# Patient Record
Sex: MALE | Race: WHITE
[De-identification: names, ages, dates, MRNs, and addresses within clinical notes are randomized per-mention and may not be internally consistent; named-entity substitution may affect disease eponyms.]

---

## 2018-10-04 NOTE — CT
ABDOMEN CT WITH CONTRAST

PELVIC CT WITH CONTRAST 

10/4/18

 

HISTORY: 

Right upper quadrant pain .

 

COMPARISON:  

None.

 

TECHNIQUE:  

Abdomen and pelvic CT are performed with IV and oral contrast. Coronal reformatted images are submitt
ed for interpretation. 

 

FINDINGS:  

 

ABDOMEN CT:

Lung bases are clear. Normal heart size. No pericardial fluid. The descending thoracic aorta and abdo
vesna aorta have a normal caliber. No periaortic fat stranding. Gallbladder is unremarkable. Patent p
ortal vein is patent. 

 

Hypoattenuation of the liver due to hepatic steatosis. Spleen, pancreas, and adrenal glands are  unre
markable. 

 

No gastrohepatic, retrocrural or periportal lymphadenopathy. 

 

No mesenteric mass, lymphadenopathy, free air or free fluid. 

 

Hypodensities in the left and right kidney are too small to characterize. There may be a septated cys
t in the left kidney measuring 1.5 cm in craniocaudal dimension. Questionable enhancing septation in 
the upper pole of the right kidney measuring 1 cm in craniocaudal dimension. Additional hypodensities
 are noted. Bilaterally, no obstructive uropathy. 

 

Gastric mucosa, duodenum and multiple normal caliber small bowel loops. Ileocecal junction is normal.
 Normal caliber appendix. Scattered fecal material in a nondistended, nondilated colon. 

 

PELVIC CT:

No mass, lymphadenopathy, free air or free fluid. 

 

No lytic or blastic lesions in the osseous structures.

 

IMPRESSION:  

1.      Hypodense lesions in the left and right kidney as detailed above. Nonemergent abdomen MRI can
 be performed for better characterization. 

2.      Hepatic steatosis.

3.      No evidence of acute abnormality in the abdomen or pelvis. 

4.      Normal caliber appendix. 

 

POS: PPP
no

## 2018-11-08 ENCOUNTER — HOSPITAL ENCOUNTER (OUTPATIENT)
Dept: HOSPITAL 92 - TBSIIMAG | Age: 58
Discharge: HOME | End: 2018-11-08
Attending: ORTHOPAEDIC SURGERY
Payer: COMMERCIAL

## 2018-11-08 DIAGNOSIS — S43.401A: ICD-10-CM

## 2018-11-08 DIAGNOSIS — M75.121: Primary | ICD-10-CM

## 2018-11-08 DIAGNOSIS — M24.411: ICD-10-CM

## 2018-11-08 DIAGNOSIS — R60.0: ICD-10-CM

## 2018-11-08 DIAGNOSIS — M25.811: ICD-10-CM

## 2018-11-13 ENCOUNTER — HOSPITAL ENCOUNTER (OUTPATIENT)
Dept: HOSPITAL 92 - LABBT | Age: 58
Discharge: HOME | End: 2018-11-13
Attending: ORTHOPAEDIC SURGERY
Payer: COMMERCIAL

## 2018-11-13 DIAGNOSIS — M75.101: ICD-10-CM

## 2018-11-13 DIAGNOSIS — Z01.818: Primary | ICD-10-CM

## 2018-11-13 LAB
ANION GAP SERPL CALC-SCNC: 12 MMOL/L (ref 10–20)
BASOPHILS # BLD AUTO: 0.1 THOU/UL (ref 0–0.2)
BASOPHILS NFR BLD AUTO: 0.8 % (ref 0–1)
BUN SERPL-MCNC: 17 MG/DL (ref 8.4–25.7)
CALCIUM SERPL-MCNC: 9.9 MG/DL (ref 7.8–10.44)
CHLORIDE SERPL-SCNC: 103 MMOL/L (ref 98–107)
CO2 SERPL-SCNC: 26 MMOL/L (ref 22–29)
CREAT CL PREDICTED SERPL C-G-VRATE: 0 ML/MIN (ref 70–130)
EOSINOPHIL # BLD AUTO: 0.2 THOU/UL (ref 0–0.7)
EOSINOPHIL NFR BLD AUTO: 2.8 % (ref 0–10)
GLUCOSE SERPL-MCNC: 97 MG/DL (ref 70–105)
HGB BLD-MCNC: 15.3 G/DL (ref 14–18)
LYMPHOCYTES # BLD: 1.6 THOU/UL (ref 1.2–3.4)
LYMPHOCYTES NFR BLD AUTO: 21.4 % (ref 21–51)
MCH RBC QN AUTO: 32.7 PG (ref 27–31)
MCV RBC AUTO: 97.5 FL (ref 78–98)
MONOCYTES # BLD AUTO: 0.6 THOU/UL (ref 0.11–0.59)
MONOCYTES NFR BLD AUTO: 8.6 % (ref 0–10)
NEUTROPHILS # BLD AUTO: 4.9 THOU/UL (ref 1.4–6.5)
NEUTROPHILS NFR BLD AUTO: 66.3 % (ref 42–75)
PLATELET # BLD AUTO: 237 THOU/UL (ref 130–400)
POTASSIUM SERPL-SCNC: 3.7 MMOL/L (ref 3.5–5.1)
RBC # BLD AUTO: 4.69 MILL/UL (ref 4.7–6.1)
SODIUM SERPL-SCNC: 137 MMOL/L (ref 136–145)
WBC # BLD AUTO: 7.3 THOU/UL (ref 4.8–10.8)

## 2018-11-13 PROCEDURE — 93010 ELECTROCARDIOGRAM REPORT: CPT

## 2018-11-13 PROCEDURE — 93005 ELECTROCARDIOGRAM TRACING: CPT

## 2018-11-13 PROCEDURE — 80048 BASIC METABOLIC PNL TOTAL CA: CPT

## 2018-11-13 PROCEDURE — 85025 COMPLETE CBC W/AUTO DIFF WBC: CPT

## 2018-11-16 ENCOUNTER — HOSPITAL ENCOUNTER (OUTPATIENT)
Dept: HOSPITAL 92 - SDC | Age: 58
Discharge: HOME | End: 2018-11-16
Attending: ORTHOPAEDIC SURGERY
Payer: COMMERCIAL

## 2018-11-16 VITALS — BODY MASS INDEX: 26.6 KG/M2

## 2018-11-16 DIAGNOSIS — I10: ICD-10-CM

## 2018-11-16 DIAGNOSIS — Z79.899: ICD-10-CM

## 2018-11-16 DIAGNOSIS — M75.21: ICD-10-CM

## 2018-11-16 DIAGNOSIS — E78.5: ICD-10-CM

## 2018-11-16 DIAGNOSIS — M75.121: Primary | ICD-10-CM

## 2018-11-16 PROCEDURE — A4306 DRUG DELIVERY SYSTEM <=50 ML: HCPCS

## 2018-11-16 PROCEDURE — 0LQ14ZZ REPAIR RIGHT SHOULDER TENDON, PERCUTANEOUS ENDOSCOPIC APPROACH: ICD-10-PCS | Performed by: ORTHOPAEDIC SURGERY

## 2018-11-16 PROCEDURE — C1713 ANCHOR/SCREW BN/BN,TIS/BN: HCPCS

## 2018-11-16 PROCEDURE — 0RNJ4ZZ RELEASE RIGHT SHOULDER JOINT, PERCUTANEOUS ENDOSCOPIC APPROACH: ICD-10-PCS | Performed by: ORTHOPAEDIC SURGERY

## 2018-11-16 NOTE — OP
DATE OF PROCEDURE:  11/16/2018

 

PREOPERATIVE DIAGNOSES:  Rotator cuff tear, biceps tendonitis, right shoulder.

 

POSTOPERATIVE DIAGNOSES:  Intact pristine looking biceps tendon, but full-thickness rotator cuff tear
.

 

PROCEDURES:  Arthroscopic subacromial decompression and rotator cuff repair.

 

SURGEON:  Solomon Turner M.D.

 

ANESTHESIA:  General.

 

BLOOD LOSS:  Minimal.

 

SPECIMEN:  None.

 

DRAINS:  None.

 

COMPLICATIONS:  None.

 

PROCEDURE IN DETAIL:  The patient was taken to the operating room where general anesthesia was induce
d.  The patient was placed in the left lateral decubitus position.  Right arm was placed in traction,
 prepped and draped.  Scope was placed in the glenohumeral joint.  Glenohumeral joint showed a very h
ealthy appearing biceps tendon, some labral fraying consistent with age, full thickness rotator cuff 
and no significant osteoarthritis.  Scope was placed in the subacromial bursa.  There was a great james
l of bursitis.  I performed a subacromial decompression and bursectomy.  CA ligament was taken down. 
 I then freshened up the greater tuberosity and freshened up the rotator cuff tear with a shaver.  I 
placed a single TwinFix suture anchor through the greater tuberosity through the freshened bone.  Sut
ures were passed through the rotator cuff and tied with a good watertight repair and then reinforced 
with a double row using self-punching SwiveLock.  Shoulder was then drained.  Portals were closed wit
h nylon suture.  Sterile dressings applied.  There were no complications.

## 2018-11-17 NOTE — EKG
Test Reason : 

Blood Pressure : ***/*** mmHG

Vent. Rate : 080 BPM     Atrial Rate : 080 BPM

   P-R Int : 124 ms          QRS Dur : 082 ms

    QT Int : 366 ms       P-R-T Axes : 049 082 050 degrees

   QTc Int : 422 ms

 

Normal sinus rhythm

Nonspecific ST abnormality

Abnormal ECG

When compared with ECG of 04-OCT-2018 17:54,

Questionable change in QRS axis

Confirmed by DR. NICK TARIQ ([id number]) on 11/17/2018 10:25:39 AM

 

Referred By:  GURVINDER           Confirmed By:DR. NICK TARIQ

## 2019-12-18 ENCOUNTER — HOSPITAL ENCOUNTER (OUTPATIENT)
Dept: HOSPITAL 92 - BICRAD | Age: 59
Discharge: HOME | End: 2019-12-18
Attending: FAMILY MEDICINE
Payer: COMMERCIAL

## 2019-12-18 DIAGNOSIS — N17.9: ICD-10-CM

## 2019-12-18 DIAGNOSIS — M47.816: ICD-10-CM

## 2019-12-18 DIAGNOSIS — I25.10: ICD-10-CM

## 2019-12-18 DIAGNOSIS — G62.9: ICD-10-CM

## 2019-12-18 DIAGNOSIS — N20.0: ICD-10-CM

## 2019-12-18 DIAGNOSIS — D64.9: ICD-10-CM

## 2019-12-18 DIAGNOSIS — N28.1: ICD-10-CM

## 2019-12-18 DIAGNOSIS — A48.3: ICD-10-CM

## 2019-12-18 DIAGNOSIS — M47.817: Primary | ICD-10-CM

## 2019-12-18 DIAGNOSIS — A02.1: ICD-10-CM

## 2019-12-18 DIAGNOSIS — E78.5: ICD-10-CM

## 2019-12-18 DIAGNOSIS — M32.14: ICD-10-CM

## 2019-12-18 DIAGNOSIS — M35.00: ICD-10-CM

## 2019-12-18 DIAGNOSIS — I11.9: ICD-10-CM

## 2019-12-18 DIAGNOSIS — K21.9: ICD-10-CM

## 2019-12-18 PROCEDURE — 72100 X-RAY EXAM L-S SPINE 2/3 VWS: CPT

## 2019-12-18 NOTE — RAD
XR Lumbar Spine 2 Or 3 View



HISTORY: Low back pain, right-sided sciatica, osteoarthritis



FINDINGS:

Degenerative changes are present. No fracture, subluxation or bony destruction is seen.



IMPRESSION: lumbar spondylosis



Reported By: Artem Sanchez 

Electronically Signed:  12/18/2019 10:20 AM

## 2020-01-14 ENCOUNTER — HOSPITAL ENCOUNTER (OUTPATIENT)
Dept: HOSPITAL 92 - SCSMRI | Age: 60
Discharge: HOME | End: 2020-01-14
Attending: FAMILY MEDICINE
Payer: COMMERCIAL

## 2020-01-14 DIAGNOSIS — M48.07: ICD-10-CM

## 2020-01-14 DIAGNOSIS — M54.42: Primary | ICD-10-CM

## 2020-01-14 DIAGNOSIS — E88.2: ICD-10-CM

## 2020-01-14 PROCEDURE — 72148 MRI LUMBAR SPINE W/O DYE: CPT

## 2020-01-14 NOTE — MRI
MRI lumbar spine noncontrast:



HISTORY:

Right-sided low back pain. Left-sided sciatica. Pain radiates down the right leg and hip x2.5 months.




COMPARISON:

None.



FINDINGS:

Appropriate T1 marrow signal intensity of the lumbar vertebra. Mild heterogeneity due to senescent ch
joe. Lumbar spine vertebral body height is maintained. No fracture. No significant STIR

hyperintensity to suggest vertebral body edema or ligamentous injury.

Appropriate signal intensity visualized paraspinal muscles. T2 hyperintensities in the left or right 
renal cortex, compatible with cortical cysts. Otherwise, unremarkable signal intensity visualized

solid organs

Conus medullaris terminates at the T12-L1 disc space





T12-L1:Adequate disc hydration. No significant central canal stenosis or significant neural foraminal
 narrowing



L1-L2:Adequate disc hydration. No significant canal stenosis or significant neural foraminal narrowin
g



L2-L3:Minimal disc desiccation without significant loss of disc space height. Broad-based disc bulge 
minimally abuts the thecal sac. No significant central canal stenosis. Bilaterally the neural

foramina are patent



L3-L4:Minimal desiccation without significant loss of disc space height. Broad-based disc bulge minim
ally contacts the ventral thecal sac. No significant central canal stenosis. Bilaterally, neural

foramina pain



L4-L5:Adequate disc hydration. No significant posterior disc of body. No significant central canal st
enosis. Right neural foramen is patent. Minimal left foraminal narrowing.



L5-S1:Mild loss of disc space height. Broad-based disc bulge does not cause any significant mass effe
ct upon the thecal sac. Narrowing of the thecal sac is noted, due to epidural lipomatosis. Mild to

moderate right neural foraminal narrowing and moderate left foraminal narrowing predominantly due to 
facet hypertrophy.



IMPRESSION:



1. Narrowing of the thecal sac at L5-S1 essentially due to epidural lipomatosis.

2. Mild to moderate right neural foraminal narrowing and moderate left foraminal narrowing at L5-S1..




Transcribed Date/Time: 1/14/2020 4:52 PM



Reported By: Pinky De Santiago 

Electronically Signed:  1/14/2020 4:54 PM

## 2020-04-22 NOTE — MRI
MRI OF LUMBAR SPINE

4/22/20

 

PROVIDED CLINICAL HISTORY:  

Lumbar radiculopathy. 

 

FINDINGS: 

Comparison 1/14/20.

 

Five lumbar vertebral bodies are again assumed. Lumbar alignment appears unchanged. Vertebral body he
ights are maintained. The conus medullaris is normal in signal and terminates at an appropriate level
. Bilateral T2 renal hyperintensities are redemonstrated, subcentimeter and statistically reflecting 
cysts. 

 

At L1-2, there is no significant central canal or foraminal narrowing apparent. 

 

At L2-3, there is a broad based disc bulge without significant central canal or foraminal narrowing a
pparent.

 

At L3-4, there is a broad based disc bulge without significant central canal or foraminal narrowing a
pparent. 

 

At L4-5, there is a broad based disc bulge and mild bilateral facet arthritis without significant olivia
tral canal or foraminal narrowing apparent. 

 

At L5-S1, there is a broad based disc bulge and bilateral facet arthritis in addition to conspicuity 
of the epidural fat. There is circumferential attenuation of the thecal sac on the basis of epidural 
lipomatosis, without further attenuation due to the disc bulge. There is moderate bilateral foraminal
 narrowing, similar to prior. 

 

IMPRESSION: 

Significant interval change with respect to the prior examination is no apparent. 

 

POS: J CARLOS

## 2020-04-22 NOTE — MRI
MRI cervical spine noncontrast



HISTORY: Neck pain. Radiculopathy. Spinal stenosis.



FINDINGS: Vertebral body heights and alignment are maintained. Bone marrow signal is within normal li
mits. There is desiccation of all of the intervertebral discs.



C2-3: Mild posterior osteophyte/disc complex, greater on the right. Severe stenosis of the right neur
al foramen. Central canal and left neural foramen are patent.



C3-4: Mild posterior osteophyte/disc complex. Mild stenosis of the left neural foramen. Central canal
 and right neural foramen are patent.



C4-5: Disc space narrowing. Posterior osteophyte/disc complex and circumferential degenerative change
s. Moderate stenosis of the central canal. Severe bilateral foraminal stenoses. No abnormal signal

within the spinal cord.



C5-6: Disc space narrowing. Prominent posterior osteophyte/disc complex. Circumferential degenerative
 changes. Severe stenosis of the central canal. No abnormal signal within the spinal cord. Moderate

right and severe left foraminal stenoses.



C6-7: Disc space narrowing. Posterior osteophyte/disc complex and circumferential degenerative change
s. Moderate stenosis of the central canal. Severe bilateral foraminal stenoses.



C7-T1: Mild osteophytosis. Central canal and neural foramina are patent.







  



IMPRESSION :

Prominent multilevel degenerative changes throughout the cervical spine. Central canal stenosis most 
severe at the C5-6 level. Multilevel severe bilateral foraminal stenoses.



Reported By: POLLY Medellin 

Electronically Signed:  4/22/2020 3:44 PM

## 2020-05-13 ENCOUNTER — HOSPITAL ENCOUNTER (OUTPATIENT)
Dept: HOSPITAL 92 - SCSMRI | Age: 60
Discharge: HOME | End: 2020-05-13
Attending: NEUROLOGICAL SURGERY
Payer: COMMERCIAL

## 2020-05-13 DIAGNOSIS — M47.812: ICD-10-CM

## 2020-05-13 DIAGNOSIS — M54.5: ICD-10-CM

## 2020-05-13 DIAGNOSIS — M48.02: ICD-10-CM

## 2020-05-13 DIAGNOSIS — R29.898: ICD-10-CM

## 2020-05-13 DIAGNOSIS — M54.16: Primary | ICD-10-CM

## 2020-05-13 PROCEDURE — 72148 MRI LUMBAR SPINE W/O DYE: CPT

## 2020-05-13 PROCEDURE — 72141 MRI NECK SPINE W/O DYE: CPT

## 2020-05-13 PROCEDURE — 72146 MRI CHEST SPINE W/O DYE: CPT

## 2020-05-13 NOTE — MRI
MRI thoracic spine noncontrast:



Attention weston in billing: The patient should not be charged for this examination.



DATE:

5/13/2020



HISTORY:

60-year-old male with mid back pain and lower extremity weakness.



For 4/22/2020, MRI of the cervical spine and thoracic spine were ordered.

At [facility], and MRI of the cervical spine and lumbar spine was performed.

The patient should not be charged either a technical fee or professional fee for this thoracic spine 
MRI.



COMPARISON:

None



FINDINGS:

Vertebral body heights are maintained. No bone marrow edema. Somewhat heterogeneous nonspecific patch
y marrow signal, probably senescent marrow changes. Multilevel mild-moderate degenerative disc

disease throughout almost all levels. Thoracic spinal cord is normal in size and signal. No severe ne
ural foraminal stenosis at any level. No major pathology of perivertebral spaces. Conus medullaris

terminates at approximately L1. No high-grade central bony spinal canal stenosis at any level. Poster
ior epidural fat pad throughout almost all levels, thicker at some levels than others. This results

in mild thecal sac stenosis at some levels.



T3-4: Shallow central and bilateral paracentral broad-based disc herniation or disc-osteophyte comple
x indents the ventral aspect of the thecal sac and abuts the ventral surface of the spinal cord,

with minimal indentation.



Tiny central disc-osteophyte complex protrusions at T4-5 and T6-7, without cord indentation.



T7-8: Small focal central disc protrusion or focal disc-osteophyte complex slightly indents the ventr
al surface of the spinal cord.



T8-9: Central and bilateral paracentral small disc protrusion or disc-osteophyte complex slightly min
imally indents the ventral surface of the spinal cord.



Tiny central disc protrusion or disc-osteophyte complex at T9-10 and T10-11 without cord contact.



IMPRESSION:

1. Thoracic spondylosis consisting of multilevel degenerative disc disease, including multilevel smal
l disc herniations and/or disc-osteophyte complexes encroaching upon the anterior aspect of the

spinal canal, some with cord contact and mild cortical indentations.

2. No high-grade central spinal canal stenosis or high-grade neural foraminal stenosis, at any level 
in the thoracic spine.



Reported By: José Ba 

Electronically Signed:  5/13/2020 4:28 PM

## 2020-06-12 ENCOUNTER — HOSPITAL ENCOUNTER (OUTPATIENT)
Dept: HOSPITAL 92 - LABBT | Age: 60
Discharge: HOME | End: 2020-06-12
Attending: NEUROLOGICAL SURGERY
Payer: COMMERCIAL

## 2020-06-12 DIAGNOSIS — Z01.818: Primary | ICD-10-CM

## 2020-06-12 DIAGNOSIS — D17.79: ICD-10-CM

## 2020-06-12 DIAGNOSIS — Z11.59: ICD-10-CM

## 2020-06-12 DIAGNOSIS — M48.061: ICD-10-CM

## 2020-06-12 DIAGNOSIS — M54.16: ICD-10-CM

## 2020-06-12 LAB
ANION GAP SERPL CALC-SCNC: 14 MMOL/L (ref 10–20)
APTT PPP: 27.2 SEC (ref 22.9–36.1)
BUN SERPL-MCNC: 14 MG/DL (ref 8.4–25.7)
CALCIUM SERPL-MCNC: 9.9 MG/DL (ref 7.8–10.44)
CHLORIDE SERPL-SCNC: 106 MMOL/L (ref 98–107)
CO2 SERPL-SCNC: 23 MMOL/L (ref 22–29)
CREAT CL PREDICTED SERPL C-G-VRATE: 0 ML/MIN (ref 70–130)
GLUCOSE SERPL-MCNC: 95 MG/DL (ref 70–105)
HGB BLD-MCNC: 14.3 G/DL (ref 14–18)
INR PPP: 0.9
MCH RBC QN AUTO: 33.2 PG (ref 27–31)
MCV RBC AUTO: 97.1 FL (ref 78–98)
PLATELET # BLD AUTO: 200 THOU/UL (ref 130–400)
POTASSIUM SERPL-SCNC: 3.2 MMOL/L (ref 3.5–5.1)
PROTHROMBIN TIME: 12.3 SEC (ref 12–14.7)
RBC # BLD AUTO: 4.32 MILL/UL (ref 4.7–6.1)
SODIUM SERPL-SCNC: 140 MMOL/L (ref 136–145)
WBC # BLD AUTO: 8.7 THOU/UL (ref 4.8–10.8)

## 2020-06-12 PROCEDURE — 85610 PROTHROMBIN TIME: CPT

## 2020-06-12 PROCEDURE — 93010 ELECTROCARDIOGRAM REPORT: CPT

## 2020-06-12 PROCEDURE — 87635 SARS-COV-2 COVID-19 AMP PRB: CPT

## 2020-06-12 PROCEDURE — 85730 THROMBOPLASTIN TIME PARTIAL: CPT

## 2020-06-12 PROCEDURE — U0003 INFECTIOUS AGENT DETECTION BY NUCLEIC ACID (DNA OR RNA); SEVERE ACUTE RESPIRATORY SYNDROME CORONAVIRUS 2 (SARS-COV-2) (CORONAVIRUS DISEASE [COVID-19]), AMPLIFIED PROBE TECHNIQUE, MAKING USE OF HIGH THROUGHPUT TECHNOLOGIES AS DESCRIBED BY CMS-2020-01-R: HCPCS

## 2020-06-12 PROCEDURE — 85027 COMPLETE CBC AUTOMATED: CPT

## 2020-06-12 PROCEDURE — 80048 BASIC METABOLIC PNL TOTAL CA: CPT

## 2020-06-12 PROCEDURE — 93005 ELECTROCARDIOGRAM TRACING: CPT

## 2020-06-15 NOTE — EKG
Test Reason : 

Blood Pressure : ***/*** mmHG

Vent. Rate : 068 BPM     Atrial Rate : 068 BPM

   P-R Int : 138 ms          QRS Dur : 098 ms

    QT Int : 384 ms       P-R-T Axes : 033 046 039 degrees

   QTc Int : 408 ms

 

Normal sinus rhythm

Nonspecific ST abnormality

Abnormal ECG

 

Confirmed by BARTOLO EDWARDS ([id number]) on 6/15/2020 11:45:15 AM

 

Referred By:  TOMAS           Confirmed By:BARTOLO EDWARDS

## 2020-06-16 ENCOUNTER — HOSPITAL ENCOUNTER (OUTPATIENT)
Dept: HOSPITAL 92 - SDC | Age: 60
LOS: 1 days | Discharge: HOME | End: 2020-06-17
Attending: NEUROLOGICAL SURGERY
Payer: COMMERCIAL

## 2020-06-16 VITALS — BODY MASS INDEX: 24.1 KG/M2

## 2020-06-16 DIAGNOSIS — Z79.899: ICD-10-CM

## 2020-06-16 DIAGNOSIS — M48.062: Primary | ICD-10-CM

## 2020-06-16 DIAGNOSIS — F17.200: ICD-10-CM

## 2020-06-16 DIAGNOSIS — E88.2: ICD-10-CM

## 2020-06-16 DIAGNOSIS — M48.02: ICD-10-CM

## 2020-06-16 DIAGNOSIS — M54.16: ICD-10-CM

## 2020-06-16 PROCEDURE — 01NB0ZZ RELEASE LUMBAR NERVE, OPEN APPROACH: ICD-10-PCS | Performed by: NEUROLOGICAL SURGERY

## 2020-06-16 PROCEDURE — 76000 FLUOROSCOPY <1 HR PHYS/QHP: CPT

## 2020-06-16 RX ADMIN — CEFAZOLIN SODIUM SCH MLS: 2 SOLUTION INTRAVENOUS at 16:48

## 2020-06-17 VITALS — TEMPERATURE: 98.5 F | DIASTOLIC BLOOD PRESSURE: 70 MMHG | SYSTOLIC BLOOD PRESSURE: 115 MMHG

## 2020-06-17 RX ADMIN — CEFAZOLIN SODIUM SCH MLS: 2 SOLUTION INTRAVENOUS at 00:12

## 2020-06-17 NOTE — OP
DATE OF PROCEDURE:  06/16/2020



ASSISTANT:  Lyndsay Talbert PA-C



PREPROCEDURE DIAGNOSIS:  Lumbar stenosis with lumbar epidural lipomatosis.



POSTPROCEDURE DIAGNOSIS:  Lumbar stenosis with lumbar epidural lipomatosis.



PROCEDURE PERFORMED:  L4-L5, L5-S1 laminectomies, partial facetectomies, and

foraminotomies. 



DESCRIPTION OF PROCEDURE:  After informed consent was obtained from the patient, the

patient was brought to the OR.  Proper patient, pause, and identification were

carried out.  He was placed under excellent general endotracheal anesthesia and

positioned prone on the OR table.  All appropriate points were padded.  We

identified the L4, L5, and S1 dorsal spines and lamina.  A linear alana was made over

this region.  This area sterilely cleansed, prepared, and draped.  Proper patient,

pause, and identification were carried out.  We then opened the L4 to S1 wound with

subperiosteal dissection.  The L4, L5, and S1 dorsal spines and lamina were exposed.

 Localization film confirmed our area of interest.  We then performed L4-S1

laminectomy, partial facetectomy, and foraminotomy, removal of the epidural

lipomatosis.  There was no spinal fluid leak.  We had excellent decompression of

common dural tube and nerve roots.  Copious irrigation occurred throughout as did

maximizing hemostasis.  The wound was then closed in anatomic layers following the

sprinkling of vancomycin powder. 







Job ID:  [id number]

## 2020-06-17 NOTE — PRG
DATE OF SERVICE:  



Mr. Srivastava is postop day 1 from lumbar laminectomy and epidural lipomatosis removal.

He has right leg pain.  He is mobilizing, plan for dismissal.  He is neurologically

intact. 







Job ID:  [id number]